# Patient Record
Sex: FEMALE | NOT HISPANIC OR LATINO | URBAN - METROPOLITAN AREA
[De-identification: names, ages, dates, MRNs, and addresses within clinical notes are randomized per-mention and may not be internally consistent; named-entity substitution may affect disease eponyms.]

---

## 2024-03-05 ENCOUNTER — APPOINTMENT (RX ONLY)
Dept: URBAN - METROPOLITAN AREA CLINIC 341 | Facility: CLINIC | Age: 49
Setting detail: DERMATOLOGY
End: 2024-03-05

## 2024-03-05 DIAGNOSIS — H02.6 XANTHELASMA OF EYELID: ICD-10-CM | Status: INADEQUATELY CONTROLLED

## 2024-03-05 PROBLEM — H02.61 XANTHELASMA OF RIGHT UPPER EYELID: Status: ACTIVE | Noted: 2024-03-05

## 2024-03-05 PROBLEM — H02.64 XANTHELASMA OF LEFT UPPER EYELID: Status: ACTIVE | Noted: 2024-03-05

## 2024-03-05 PROBLEM — H02.65 XANTHELASMA OF LEFT LOWER EYELID: Status: ACTIVE | Noted: 2024-03-05

## 2024-03-05 PROBLEM — H02.62 XANTHELASMA OF RIGHT LOWER EYELID: Status: ACTIVE | Noted: 2024-03-05

## 2024-03-05 PROCEDURE — 99202 OFFICE O/P NEW SF 15 MIN: CPT

## 2024-03-05 PROCEDURE — ? DEFER

## 2024-03-05 PROCEDURE — ? COUNSELING

## 2024-03-05 ASSESSMENT — LOCATION SIMPLE DESCRIPTION DERM
LOCATION SIMPLE: RIGHT INFERIOR EYELID
LOCATION SIMPLE: RIGHT SUPERIOR EYELID
LOCATION SIMPLE: LEFT SUPERIOR EYELID
LOCATION SIMPLE: LEFT INFERIOR EYELID

## 2024-03-05 ASSESSMENT — LOCATION DETAILED DESCRIPTION DERM
LOCATION DETAILED: LEFT MEDIAL SUPERIOR EYELID
LOCATION DETAILED: RIGHT MEDIAL SUPERIOR EYELID
LOCATION DETAILED: LEFT LATERAL INFERIOR PRESEPTAL REGION
LOCATION DETAILED: RIGHT MEDIAL INFERIOR EYELID

## 2024-03-05 ASSESSMENT — LOCATION ZONE DERM: LOCATION ZONE: EYELID

## 2024-03-05 NOTE — PROCEDURE: DEFER
Introduction Text (Please End With A Colon): The following was deferred:
Instructions (Optional): Pt given information for [facility] and Dr Alexander
X Size Of Lesion In Cm (Optional): 0
Detail Level: Detailed